# Patient Record
Sex: FEMALE | Race: BLACK OR AFRICAN AMERICAN | NOT HISPANIC OR LATINO | Employment: FULL TIME | URBAN - METROPOLITAN AREA
[De-identification: names, ages, dates, MRNs, and addresses within clinical notes are randomized per-mention and may not be internally consistent; named-entity substitution may affect disease eponyms.]

---

## 2017-04-28 ENCOUNTER — HOSPITAL ENCOUNTER (EMERGENCY)
Facility: HOSPITAL | Age: 32
Discharge: HOME | End: 2017-04-28
Attending: EMERGENCY MEDICINE
Payer: MEDICAID

## 2017-04-28 VITALS
BODY MASS INDEX: 27.97 KG/M2 | SYSTOLIC BLOOD PRESSURE: 126 MMHG | TEMPERATURE: 99 F | RESPIRATION RATE: 16 BRPM | OXYGEN SATURATION: 99 % | WEIGHT: 174 LBS | HEART RATE: 77 BPM | DIASTOLIC BLOOD PRESSURE: 82 MMHG | HEIGHT: 66 IN

## 2017-04-28 DIAGNOSIS — R07.9 CHEST PAIN, UNSPECIFIED TYPE: Primary | ICD-10-CM

## 2017-04-28 DIAGNOSIS — R07.9 CHEST PAIN: ICD-10-CM

## 2017-04-28 LAB
ANION GAP SERPL CALC-SCNC: 8 MMOL/L
ANISOCYTOSIS BLD QL SMEAR: SLIGHT
BASOPHILS # BLD AUTO: 0.04 K/UL
BASOPHILS NFR BLD: 0.7 %
BUN SERPL-MCNC: 9 MG/DL
BURR CELLS BLD QL SMEAR: ABNORMAL
CALCIUM SERPL-MCNC: 8.9 MG/DL
CHLORIDE SERPL-SCNC: 106 MMOL/L
CO2 SERPL-SCNC: 24 MMOL/L
CREAT SERPL-MCNC: 0.8 MG/DL
DIFFERENTIAL METHOD: ABNORMAL
EOSINOPHIL # BLD AUTO: 0.1 K/UL
EOSINOPHIL NFR BLD: 2.1 %
ERYTHROCYTE [DISTWIDTH] IN BLOOD BY AUTOMATED COUNT: 20.7 %
EST. GFR  (AFRICAN AMERICAN): >60 ML/MIN/1.73 M^2
EST. GFR  (NON AFRICAN AMERICAN): >60 ML/MIN/1.73 M^2
GIANT PLATELETS BLD QL SMEAR: PRESENT
GLUCOSE SERPL-MCNC: 107 MG/DL
HCT VFR BLD AUTO: 27.4 %
HGB BLD-MCNC: 8.4 G/DL
HYPOCHROMIA BLD QL SMEAR: ABNORMAL
INR PPP: 1
LYMPHOCYTES # BLD AUTO: 1.3 K/UL
LYMPHOCYTES NFR BLD: 23.5 %
MCH RBC QN AUTO: 20 PG
MCHC RBC AUTO-ENTMCNC: 30.7 %
MCV RBC AUTO: 65 FL
MONOCYTES # BLD AUTO: 0.6 K/UL
MONOCYTES NFR BLD: 9.9 %
NEUTROPHILS # BLD AUTO: 3.6 K/UL
NEUTROPHILS NFR BLD: 64 %
OVALOCYTES BLD QL SMEAR: ABNORMAL
PLATELET # BLD AUTO: 411 K/UL
PLATELET BLD QL SMEAR: ABNORMAL
PMV BLD AUTO: 8.2 FL
POIKILOCYTOSIS BLD QL SMEAR: SLIGHT
POLYCHROMASIA BLD QL SMEAR: ABNORMAL
POTASSIUM SERPL-SCNC: 3.7 MMOL/L
PROTHROMBIN TIME: 10.6 SEC
RBC # BLD AUTO: 4.2 M/UL
SODIUM SERPL-SCNC: 138 MMOL/L
TARGETS BLD QL SMEAR: ABNORMAL
TROPONIN I SERPL DL<=0.01 NG/ML-MCNC: <0.006 NG/ML
WBC # BLD AUTO: 5.67 K/UL

## 2017-04-28 PROCEDURE — 99284 EMERGENCY DEPT VISIT MOD MDM: CPT | Mod: 25

## 2017-04-28 PROCEDURE — 85025 COMPLETE CBC W/AUTO DIFF WBC: CPT

## 2017-04-28 PROCEDURE — 80048 BASIC METABOLIC PNL TOTAL CA: CPT

## 2017-04-28 PROCEDURE — 85610 PROTHROMBIN TIME: CPT

## 2017-04-28 PROCEDURE — 93005 ELECTROCARDIOGRAM TRACING: CPT

## 2017-04-28 PROCEDURE — 93010 ELECTROCARDIOGRAM REPORT: CPT | Mod: ,,, | Performed by: INTERNAL MEDICINE

## 2017-04-28 PROCEDURE — 84484 ASSAY OF TROPONIN QUANT: CPT

## 2017-04-28 NOTE — ED AVS SNAPSHOT
[facility]  [id number] [facility] [address]               Melissa Santos   2017  5:56 PM   ED    Description:  Female : 1985   Department:  [facility]            Your Care was Coordinated By:     Provider Role From To    Rick Aaron MD Attending Provider [phone] --      Reason for Visit     Chest Pain           Diagnoses this Visit        Comments    Chest pain, unspecified type    -  Primary     Chest pain           ED Disposition     None           To Do List           Follow-up Information     Follow up with Primary Doctor No In 1 day.    Why:  If symptoms worsen.      [facility]     [facility] -  Assistance  Unless otherwise directed by your provider, please contact [facility], our nurse care line that is available for  assistance.     Registered nurses in the [facility] provide: appointment scheduling, clinical advisement, health education, and other advisory services.  Call: [phone] (toll free)               Medications           Message regarding Medications     Verify the changes and/or additions to your medication regime listed below are the same as discussed with your clinician today.  If any of these changes or additions are incorrect, please notify your healthcare provider.             Verify that the below list of medications is an accurate representation of the medications you are currently taking.  If none reported, the list may be blank. If incorrect, please contact your healthcare provider. Carry this list with you in case of emergency.           Current Medications     divalproex (DEPAKOTE) 500 MG TbEC Take 500 mg by mouth every 12 (twelve) hours.    METRONIDAZOLE (FLAGYL ORAL) Take by mouth.    risperidone (RISPERDAL M-TAB) 0.5 MG TbDL Take by mouth.    diclofenac (VOLTAREN) 50 MG EC tablet Take 1 tablet (50 mg total) by mouth 3 (three)  "times daily as needed.    ferrous gluconate (FERGON) 325 MG Tab Take 1 tablet (325 mg total) by mouth daily with breakfast.    hydrOXYzine (ATARAX) 25 MG tablet Take 2-4 tablets ( mg total) by mouth every 6 to 8 hours as needed for Anxiety.    lorazepam (ATIVAN) 1 MG tablet Take 0.5 tablets (0.5 mg total) by mouth every 6 (six) hours as needed for Anxiety.    naproxen (NAPROSYN) 375 MG tablet Take 1 tablet (375 mg total) by mouth 2 (two) times daily with meals.           Clinical Reference Information           Your Vitals Were     BP Pulse Temp Resp Height Weight    124/83 71 98.4 °F (36.9 °C) (Oral) 14 5' 6" (1.676 m) 78.9 kg (174 lb)    SpO2 BMI             99% 28.08 kg/m2         Allergies as of 4/28/2017     No Known Allergies      Immunizations Administered on Date of Encounter - 4/28/2017     None      ED Micro, Lab, POCT     Start Ordered       Status Ordering Provider    04/28/17 1812 04/28/17 1811  CBC auto differential  STAT      Preliminary result     04/28/17 1812 04/28/17 1811  Basic metabolic panel  STAT      Final result     04/28/17 1812 04/28/17 1811  Protime-INR  STAT      Final result     04/28/17 1812 04/28/17 1811  Troponin I  STAT      Final result       ED Imaging Orders     Start Ordered       Status Ordering Provider    04/28/17 1812 04/28/17 1811  X-Ray Chest 1 View  1 time imaging      Final result         Discharge Instructions         Noncardiac Chest Pain    Based on your visit today, the health care provider doesnt know what is causing your chest pain. In most cases, people who come to the emergency department with chest pain dont have a problem with their heart. Instead, the pain is caused by other conditions. These may be problems with the lungs, muscles, bones, digestive tract, nerves, or mental health.  Lung problems  · Inflammation around the lungs (pleurisy)  · Collapsed lung (pneumothorax)  · Fluid around the lungs (pleural effusion)  · Lung cancer. This is a rare cause " of chest pain.  Muscle or bone problems  · Inflamed cartilage between the ribs (pleurisy)  · Fibromyalgia  · Rheumatoid arthritis  Digestive system problems  · Reflux  · Stomach ulcer  · Spasms of the esophagus  · Gall stones  · Gallbladder inflammation  Mental health conditions  · Panic or anxiety attacks  · Emotional distress  Your condition doesnt seem serious and your pain doesnt appear to be coming from your heart. But sometimes the signs of a serious problem take more time to appear. Watch for the warning signs listed below.  Home care  Follow these guidelines when caring for yourself at home:  · Rest today and avoid strenuous activity.  · Take any prescribed medicine as directed.  Follow-up care  Follow up with your health care provider, or as advised, if you dont start to feel better within 24 hours.  When to seek medical advice  Call your health care provider right away if any of these occur:  · A change in the type of pain. Call if it feels different, becomes more serious, lasts longer, or begins to spread into your shoulder, arm, neck, jaw, or back.  · Shortness of breath  · You feel more pain when you breathe  · Cough with dark-colored mucus or blood  · Weakness, dizziness, or fainting  · Fever of 100.4ºF (38ºC) or higher, or as directed by your health care provider  · Swelling, pain, or redness in one leg  Date Last Reviewed: 11/24/2014  © 1628-3513 [email or web address]. [address] [place] [address]. All rights reserved. This information is not intended as a substitute for professional medical care. Always follow your healthcare professional's instructions.           [facility] complies with applicable Federal civil rights laws and does not discriminate on the basis of race, color, national origin, age, disability, or sex.        Language Assistance Services     ATTENTION: Language assistance services are available, free of charge. Please call [phone].       ATENCIÓN: Si habla español, tiene a quiñones disposición servicios gratuitos de asistencia lingüística. Llame al [phone].     CHÚ Ý: N?u b?n nói Ti?ng Vi?t, có các d?ch v? h? tr? ngôn ng? mi?n phí dành cho b?n. G?i s? [phone].

## 2017-04-28 NOTE — ED PROVIDER NOTES
"SCRIBE #1 NOTE: I, Hai Radford, am scribing for, and in the presence of, Rick Aaron MD. I have scribed the entire note.      History      Chief Complaint   Patient presents with    Chest Pain     "i have been having these off and on chest pains       Review of patient's allergies indicates:  No Known Allergies     HPI   HPI    4/28/2017, 6:10 PM   History obtained from the patient      History of Present Illness: Melissa Santos is a 31 y.o. female patient who presents to the Emergency Department for mid-sternal chest pain which onset 5 days ago. Symptoms are intermitent and moderate in severity. Pt's chest pain doesn't radiate. Pt describes the chest pain as "a heaviness on her chest".  Pt's last episode of chest pain started yesterday around 3 PM and was constant until 1 PM today. Pt has no Hx of PE, birth control, recent long travel, or calf pain. No mitigating or exacerbating factors reported. No associated sx reported. Patient denies any SOB, n/v/d, diaphoresis, leg swelling, palpitations, HA, lightheadedness, dizziness, numbness, weakness, cough, and all other sxs at this time. No further complaints or concerns at this time.         Arrival mode: Personal vehicle     PCP: Primary Doctor No       Past Medical History:  Past Medical History:   Diagnosis Date    Anxiety     Bipolar disorder     Heart murmur        Past Surgical History:  Past Surgical History:   Procedure Laterality Date    TUBAL LIGATION      uterine ablation           Family History:  History reviewed. No pertinent family history.    Social History:  Social History     Social History Main Topics    Smoking status: Never Smoker    Smokeless tobacco: Unknown    Alcohol use Yes      Comment: sometimes    Drug use: No    Sexual activity: Unknown       ROS   Review of Systems   Constitutional: Negative for chills and fever.   HENT: Negative for sore throat.    Respiratory: Negative for cough and shortness of breath.  " "  Cardiovascular: Positive for chest pain. Negative for palpitations and leg swelling.   Gastrointestinal: Negative for abdominal pain, diarrhea, nausea and vomiting.   Genitourinary: Negative for dysuria.   Musculoskeletal: Negative for back pain.   Skin: Negative for rash.   Neurological: Negative for dizziness, weakness, light-headedness, numbness and headaches.   Hematological: Does not bruise/bleed easily.       Physical Exam    Initial Vitals   BP Pulse Resp Temp SpO2   04/28/17 1738 04/28/17 1738 04/28/17 1738 04/28/17 1738 04/28/17 1738   147/92 78 20 98.4 °F (36.9 °C) 99 %      Physical Exam  Nursing Notes and Vital Signs Reviewed.  Constitutional: Patient is in no acute distress. Awake and alert. Well-developed and well-nourished. PERC score of 0.  Head: Atraumatic. Normocephalic.  Eyes: PERRL. EOM intact. Conjunctivae are not pale. No scleral icterus.  ENT: Mucous membranes are moist. Oropharynx is clear and symmetric.    Neck: Supple. Full ROM. No lymphadenopathy.  Cardiovascular: Regular rate. Regular rhythm. No murmurs, rubs, or gallops. Distal pulses are 2+ and symmetric.  Pulmonary/Chest: No respiratory distress. Clear to auscultation bilaterally. No wheezing, rales, or rhonchi.  Abdominal: Soft and non-distended.  There is no tenderness.  No rebound, guarding, or rigidity.   Musculoskeletal: Moves all extremities. No obvious deformities. No edema. No calf tenderness. Calves are equal in size.  Skin: Warm and dry.  Neurological:  Alert, awake, and appropriate.  Normal speech.  No acute focal neurological deficits are appreciated.  Psychiatric: Normal affect. Good eye contact. Appropriate in content.    ED Course    Procedures  ED Vital Signs:  Vitals:    04/28/17 1738 04/28/17 1813   BP: (!) 147/92 124/83   Pulse: 78 71   Resp: 20 14   Temp: 98.4 °F (36.9 °C)    TempSrc: Oral    SpO2: 99%    Weight: 78.9 kg (174 lb)    Height: 5' 6" (1.676 m)        Abnormal Lab Results:  Labs Reviewed   CBC W/ AUTO " DIFFERENTIAL - Abnormal; Notable for the following:        Result Value    Hemoglobin 8.4 (*)     Hematocrit 27.4 (*)     MCV 65 (*)     MCH 20.0 (*)     MCHC 30.7 (*)     RDW 20.7 (*)     Platelets 411 (*)     MPV 8.2 (*)     All other components within normal limits   BASIC METABOLIC PANEL   PROTIME-INR   TROPONIN I        All Lab Results:  Results for orders placed or performed during the hospital encounter of 04/28/17   CBC auto differential   Result Value Ref Range    WBC 5.67 3.90 - 12.70 K/uL    RBC 4.20 4.00 - 5.40 M/uL    Hemoglobin 8.4 (L) 12.0 - 16.0 g/dL    Hematocrit 27.4 (L) 37.0 - 48.5 %    MCV 65 (L) 82 - 98 fL    MCH 20.0 (L) 27.0 - 31.0 pg    MCHC 30.7 (L) 32.0 - 36.0 %    RDW 20.7 (H) 11.5 - 14.5 %    Platelets 411 (H) 150 - 350 K/uL    MPV 8.2 (L) 9.2 - 12.9 fL   Basic metabolic panel   Result Value Ref Range    Sodium 138 136 - 145 mmol/L    Potassium 3.7 3.5 - 5.1 mmol/L    Chloride 106 95 - 110 mmol/L    CO2 24 23 - 29 mmol/L    Glucose 107 70 - 110 mg/dL    BUN, Bld 9 6 - 20 mg/dL    Creatinine 0.8 0.5 - 1.4 mg/dL    Calcium 8.9 8.7 - 10.5 mg/dL    Anion Gap 8 8 - 16 mmol/L    eGFR if African American >60 >60 mL/min/1.73 m^2    eGFR if non African American >60 >60 mL/min/1.73 m^2   Protime-INR   Result Value Ref Range    Prothrombin Time 10.6 9.0 - 12.5 sec    INR 1.0 0.8 - 1.2   Troponin I   Result Value Ref Range    Troponin I <0.006 0.000 - 0.026 ng/mL         Imaging Results:  Imaging Results         X-Ray Chest 1 View (Final result) Result time:  04/28/17 18:52:34    Final result by Raul Gaffney MD (04/28/17 18:52:34)    Impression:     Normal chest      Electronically signed by: RAUL GAFFNEY MD  Date:     04/28/17  Time:    18:52     Narrative:    Portable chest    Clinical indication: Chest pain    Findings: The heart and lungs are normal.  There are no infiltrates.  No change from the prior exam of 08/29/2016.                      The Emergency Provider reviewed the vital  signs and test results, which are outlined above.    ED Discussion     7:35 PM: Reassessed pt at this time. Pt is awake, alert, and in no distress. Discussed with pt all pertinent ED information and results. Discussed pt dx and plan of tx. Gave pt all f/u and return to the ED instructions. All questions and concerns were addressed at this time. Pt expresses understanding of information and instructions, and is comfortable with plan to discharge. Pt is stable for discharge.    I have discussed with patient and/or family/caretaker chest pain precautions, specifically to return for worsening chest pain, shortness of breath, fever, or any concern.  I have low suspicion for cardiopulmonary, vascular, infectious, respiratory, or other emergent medical condition based on my evaluation in the ED.      ED Medication(s):  Medications - No data to display    New Prescriptions    No medications on file       Follow-up Information     Follow up with Primary Doctor No In 1 day.    Why:  If symptoms worsen.            Medical Decision Making    Medical Decision Making:   Clinical Tests:   Lab Tests: Reviewed and Ordered  Radiological Study: Reviewed and Ordered  Medical Tests: Reviewed and Ordered           Scribe Attestation:   Scribe #1: I performed the above scribed service and the documentation accurately describes the services I performed. I attest to the accuracy of the note.    Attending:   Physician Attestation Statement for Scribe #1: I, Rick Aaron MD, personally performed the services described in this documentation, as scribed by Hai Radford, in my presence, and it is both accurate and complete.          Clinical Impression       ICD-10-CM ICD-9-CM   1. Chest pain, unspecified type R07.9 786.50   2. Chest pain R07.9 786.50       Disposition:   Disposition: Discharged  Condition: Stable         Rick Aaron MD  04/29/17 0014

## 2017-04-29 NOTE — DISCHARGE INSTRUCTIONS
Noncardiac Chest Pain    Based on your visit today, the health care provider doesnt know what is causing your chest pain. In most cases, people who come to the emergency department with chest pain dont have a problem with their heart. Instead, the pain is caused by other conditions. These may be problems with the lungs, muscles, bones, digestive tract, nerves, or mental health.  Lung problems  · Inflammation around the lungs (pleurisy)  · Collapsed lung (pneumothorax)  · Fluid around the lungs (pleural effusion)  · Lung cancer. This is a rare cause of chest pain.  Muscle or bone problems  · Inflamed cartilage between the ribs (pleurisy)  · Fibromyalgia  · Rheumatoid arthritis  Digestive system problems  · Reflux  · Stomach ulcer  · Spasms of the esophagus  · Gall stones  · Gallbladder inflammation  Mental health conditions  · Panic or anxiety attacks  · Emotional distress  Your condition doesnt seem serious and your pain doesnt appear to be coming from your heart. But sometimes the signs of a serious problem take more time to appear. Watch for the warning signs listed below.  Home care  Follow these guidelines when caring for yourself at home:  · Rest today and avoid strenuous activity.  · Take any prescribed medicine as directed.  Follow-up care  Follow up with your health care provider, or as advised, if you dont start to feel better within 24 hours.  When to seek medical advice  Call your health care provider right away if any of these occur:  · A change in the type of pain. Call if it feels different, becomes more serious, lasts longer, or begins to spread into your shoulder, arm, neck, jaw, or back.  · Shortness of breath  · You feel more pain when you breathe  · Cough with dark-colored mucus or blood  · Weakness, dizziness, or fainting  · Fever of 100.4ºF (38ºC) or higher, or as directed by your health care provider  · Swelling, pain, or redness in one leg  Date Last Reviewed: 11/24/2014  © 0277-1363  [email or web address]. [address] [place] [address], [place] [address]. All rights reserved. This information is not intended as a substitute for professional medical care. Always follow your healthcare professional's instructions.

## 2018-09-19 ENCOUNTER — HOSPITAL ENCOUNTER (EMERGENCY)
Facility: HOSPITAL | Age: 33
Discharge: HOME | End: 2018-09-19
Attending: EMERGENCY MEDICINE

## 2018-09-19 VITALS
BODY MASS INDEX: 26.62 KG/M2 | WEIGHT: 169.63 LBS | SYSTOLIC BLOOD PRESSURE: 133 MMHG | OXYGEN SATURATION: 99 % | HEART RATE: 89 BPM | HEIGHT: 67 IN | RESPIRATION RATE: 18 BRPM | TEMPERATURE: 98 F | DIASTOLIC BLOOD PRESSURE: 81 MMHG

## 2018-09-19 DIAGNOSIS — R07.89 CHEST TIGHTNESS: ICD-10-CM

## 2018-09-19 DIAGNOSIS — F41.9 ANXIETY: Primary | ICD-10-CM

## 2018-09-19 DIAGNOSIS — Z86.59 HISTORY OF ANXIETY: ICD-10-CM

## 2018-09-19 PROCEDURE — 93005 ELECTROCARDIOGRAM TRACING: CPT

## 2018-09-19 PROCEDURE — 93010 ELECTROCARDIOGRAM REPORT: CPT | Mod: ,,, | Performed by: INTERNAL MEDICINE

## 2018-09-19 PROCEDURE — 99283 EMERGENCY DEPT VISIT LOW MDM: CPT | Mod: 25

## 2018-09-19 PROCEDURE — 25000003 PHARM REV CODE 250: Performed by: REGISTERED NURSE

## 2018-09-19 RX ORDER — ALPRAZOLAM 0.5 MG/1
0.5 TABLET ORAL
Status: COMPLETED | OUTPATIENT
Start: 2018-09-19 | End: 2018-09-19

## 2018-09-19 RX ORDER — ALPRAZOLAM 0.5 MG/1
0.5 TABLET ORAL 3 TIMES DAILY PRN
Qty: 12 TABLET | Refills: 0 | Status: SHIPPED | OUTPATIENT
Start: 2018-09-19 | End: 2018-10-19

## 2018-09-19 RX ADMIN — ALPRAZOLAM 0.5 MG: 0.5 TABLET ORAL at 02:09
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
## 2019-05-25 ENCOUNTER — HOSPITAL ENCOUNTER (EMERGENCY)
Facility: HOSPITAL | Age: 34
Discharge: HOME | End: 2019-05-25
Attending: EMERGENCY MEDICINE
Payer: COMMERCIAL

## 2019-05-25 VITALS
SYSTOLIC BLOOD PRESSURE: 147 MMHG | RESPIRATION RATE: 18 BRPM | TEMPERATURE: 99 F | OXYGEN SATURATION: 99 % | HEART RATE: 71 BPM | DIASTOLIC BLOOD PRESSURE: 96 MMHG | BODY MASS INDEX: 28.19 KG/M2 | WEIGHT: 180 LBS

## 2019-05-25 DIAGNOSIS — F41.0 PANIC ATTACK: ICD-10-CM

## 2019-05-25 DIAGNOSIS — Z04.1 ENCOUNTER FOR EXAMINATION FOLLOWING MOTOR VEHICLE ACCIDENT (MVA): Primary | ICD-10-CM

## 2019-05-25 DIAGNOSIS — S33.5XXA SPRAIN OF LOW BACK, INITIAL ENCOUNTER: ICD-10-CM

## 2019-05-25 PROCEDURE — 25000003 PHARM REV CODE 250: Performed by: NURSE PRACTITIONER

## 2019-05-25 PROCEDURE — 86703 HIV-1/HIV-2 1 RESULT ANTBDY: CPT

## 2019-05-25 PROCEDURE — 99283 EMERGENCY DEPT VISIT LOW MDM: CPT

## 2019-05-25 RX ORDER — METHOCARBAMOL 500 MG/1
1000 TABLET, FILM COATED ORAL 3 TIMES DAILY PRN
Qty: 30 TABLET | Refills: 0 | Status: SHIPPED | OUTPATIENT
Start: 2019-05-25 | End: 2019-05-30

## 2019-05-25 RX ORDER — METHOCARBAMOL 500 MG/1
500 TABLET, FILM COATED ORAL
Status: COMPLETED | OUTPATIENT
Start: 2019-05-25 | End: 2019-05-25

## 2019-05-25 RX ORDER — KETOROLAC TROMETHAMINE 10 MG/1
10 TABLET, FILM COATED ORAL
Status: COMPLETED | OUTPATIENT
Start: 2019-05-25 | End: 2019-05-25

## 2019-05-25 RX ORDER — LORAZEPAM 1 MG/1
1 TABLET ORAL
Status: COMPLETED | OUTPATIENT
Start: 2019-05-25 | End: 2019-05-25

## 2019-05-25 RX ORDER — DICLOFENAC SODIUM 50 MG/1
50 TABLET, DELAYED RELEASE ORAL 3 TIMES DAILY PRN
Qty: 15 TABLET | Refills: 0 | Status: SHIPPED | OUTPATIENT
Start: 2019-05-25

## 2019-05-25 RX ADMIN — METHOCARBAMOL TABLETS 500 MG: 500 TABLET, COATED ORAL at 07:05

## 2019-05-25 RX ADMIN — LORAZEPAM 1 MG: 1 TABLET ORAL at 07:05

## 2019-05-25 RX ADMIN — KETOROLAC TROMETHAMINE 10 MG: 10 TABLET, FILM COATED ORAL at 07:05
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
## 2019-05-26 NOTE — ED NOTES
Patient identifiers verified and correct for Melissa Santos.      LOC: The patient is awake, alert and aware of environment with an appropriate affect, the patient is oriented x 3 and speaking appropriately. Pt denies hitting head and pt denies LOC.   APPEARANCE: Patient resting comfortably and in no acute distress, patient is clean and well groomed, patient's clothing is properly fastened.  SKIN: The skin is warm and dry, color consistent with ethnicity, patient has normal skin turgor and moist mucus membranes, skin intact, no breakdown or bruising noted.  MUSCULOSKELETAL: Patient moving all extremities spontaneously. Upper back pain.   RESPIRATORY: Airway is open and patent, respirations are spontaneous.  CARDIAC: Patient has a normal rate, no periphreal edema noted, capillary refill < 3 seconds.  ABDOMEN: Soft and non tender to palpation.    
1) PCP Contacted on Admission: (Y/N) --> Name & Phone #: Patient primarily resides in [place] and does not have a PCP in the [place]. Due to return to [place] on November 5th.   2) Date of Contact with PCP: N/A  3) PCP Contacted at Discharge: (N/A)  4) Summary of Handoff Given to PCP: N/A   5) Post-Discharge Appointment Date and Location: N/A
1) PCP Contacted on Admission: (Y/N) --> Name & Phone #:  2) Date of Contact with PCP:  3) PCP Contacted at Discharge: (Y/N)  4) Summary of Handoff Given to PCP:   5) Post-Discharge Appointment Date and Location:

## 2019-05-27 LAB — HIV 1+2 AB+HIV1 P24 AG SERPL QL IA: NEGATIVE

## 2020-03-17 ENCOUNTER — HOSPITAL ENCOUNTER (EMERGENCY)
Facility: HOSPITAL | Age: 35
Discharge: HOME | End: 2020-03-17
Attending: EMERGENCY MEDICINE
Payer: COMMERCIAL

## 2020-03-17 VITALS
DIASTOLIC BLOOD PRESSURE: 97 MMHG | BODY MASS INDEX: 29.53 KG/M2 | SYSTOLIC BLOOD PRESSURE: 160 MMHG | OXYGEN SATURATION: 97 % | WEIGHT: 183.75 LBS | HEIGHT: 66 IN | HEART RATE: 66 BPM | RESPIRATION RATE: 18 BRPM | TEMPERATURE: 98 F

## 2020-03-17 DIAGNOSIS — R10.9 FLANK PAIN: Primary | ICD-10-CM

## 2020-03-17 LAB
ALBUMIN SERPL BCP-MCNC: 3.8 G/DL (ref 3.5–5.2)
ALP SERPL-CCNC: 73 U/L (ref 55–135)
ALT SERPL W/O P-5'-P-CCNC: 23 U/L (ref 10–44)
ANION GAP SERPL CALC-SCNC: 10 MMOL/L (ref 8–16)
AST SERPL-CCNC: 23 U/L (ref 10–40)
BACTERIA #/AREA URNS HPF: NORMAL /HPF
BASOPHILS # BLD AUTO: 0.01 K/UL (ref 0–0.2)
BASOPHILS NFR BLD: 0.2 % (ref 0–1.9)
BILIRUB SERPL-MCNC: 0.5 MG/DL (ref 0.1–1)
BILIRUB UR QL STRIP: NEGATIVE
BUN SERPL-MCNC: 8 MG/DL (ref 6–20)
CALCIUM SERPL-MCNC: 9.3 MG/DL (ref 8.7–10.5)
CHLORIDE SERPL-SCNC: 106 MMOL/L (ref 95–110)
CLARITY UR: CLEAR
CO2 SERPL-SCNC: 23 MMOL/L (ref 23–29)
COLOR UR: YELLOW
CREAT SERPL-MCNC: 0.8 MG/DL (ref 0.5–1.4)
DIFFERENTIAL METHOD: ABNORMAL
EOSINOPHIL # BLD AUTO: 0.1 K/UL (ref 0–0.5)
EOSINOPHIL NFR BLD: 2.4 % (ref 0–8)
ERYTHROCYTE [DISTWIDTH] IN BLOOD BY AUTOMATED COUNT: 15.3 % (ref 11.5–14.5)
EST. GFR  (AFRICAN AMERICAN): >60 ML/MIN/1.73 M^2
EST. GFR  (NON AFRICAN AMERICAN): >60 ML/MIN/1.73 M^2
GLUCOSE SERPL-MCNC: 104 MG/DL (ref 70–110)
GLUCOSE UR QL STRIP: NEGATIVE
HCT VFR BLD AUTO: 36 % (ref 37–48.5)
HGB BLD-MCNC: 11.3 G/DL (ref 12–16)
HGB UR QL STRIP: ABNORMAL
IMM GRANULOCYTES # BLD AUTO: 0.01 K/UL (ref 0–0.04)
IMM GRANULOCYTES NFR BLD AUTO: 0.2 % (ref 0–0.5)
KETONES UR QL STRIP: NEGATIVE
LEUKOCYTE ESTERASE UR QL STRIP: ABNORMAL
LYMPHOCYTES # BLD AUTO: 1 K/UL (ref 1–4.8)
LYMPHOCYTES NFR BLD: 24.2 % (ref 18–48)
MCH RBC QN AUTO: 25.7 PG (ref 27–31)
MCHC RBC AUTO-ENTMCNC: 31.4 G/DL (ref 32–36)
MCV RBC AUTO: 82 FL (ref 82–98)
MICROSCOPIC COMMENT: NORMAL
MONOCYTES # BLD AUTO: 0.5 K/UL (ref 0.3–1)
MONOCYTES NFR BLD: 11 % (ref 4–15)
NEUTROPHILS # BLD AUTO: 2.5 K/UL (ref 1.8–7.7)
NEUTROPHILS NFR BLD: 62 % (ref 38–73)
NITRITE UR QL STRIP: NEGATIVE
NRBC BLD-RTO: 0 /100 WBC
PH UR STRIP: 6 [PH] (ref 5–8)
PLATELET # BLD AUTO: 264 K/UL (ref 150–350)
PMV BLD AUTO: 8.9 FL (ref 9.2–12.9)
POTASSIUM SERPL-SCNC: 3.8 MMOL/L (ref 3.5–5.1)
PROT SERPL-MCNC: 7.6 G/DL (ref 6–8.4)
PROT UR QL STRIP: NEGATIVE
RBC # BLD AUTO: 4.39 M/UL (ref 4–5.4)
RBC #/AREA URNS HPF: 1 /HPF (ref 0–4)
SODIUM SERPL-SCNC: 139 MMOL/L (ref 136–145)
SP GR UR STRIP: >=1.03 (ref 1–1.03)
URN SPEC COLLECT METH UR: ABNORMAL
UROBILINOGEN UR STRIP-ACNC: 1 EU/DL
WBC # BLD AUTO: 4.09 K/UL (ref 3.9–12.7)
WBC #/AREA URNS HPF: 3 /HPF (ref 0–5)

## 2020-03-17 PROCEDURE — 99284 PR EMERGENCY DEPT VISIT,LEVEL IV: ICD-10-PCS | Mod: ,,, | Performed by: COLON & RECTAL SURGERY

## 2020-03-17 PROCEDURE — 80053 COMPREHEN METABOLIC PANEL: CPT

## 2020-03-17 PROCEDURE — 99284 EMERGENCY DEPT VISIT MOD MDM: CPT | Mod: 25

## 2020-03-17 PROCEDURE — 85025 COMPLETE CBC W/AUTO DIFF WBC: CPT

## 2020-03-17 PROCEDURE — 99284 EMERGENCY DEPT VISIT MOD MDM: CPT | Mod: ,,, | Performed by: COLON & RECTAL SURGERY

## 2020-03-17 PROCEDURE — 81000 URINALYSIS NONAUTO W/SCOPE: CPT

## 2020-03-17 RX ORDER — NAPROXEN 375 MG/1
375 TABLET ORAL 2 TIMES DAILY WITH MEALS
Qty: 30 TABLET | Refills: 0 | Status: SHIPPED | OUTPATIENT
Start: 2020-03-17

## 2020-03-17 NOTE — HPI
34-year-old female who presents to the [facility] emergency department for a 1 day history of right flank pain.  Patient states that the pain was mildly severe in the right flank that a constant moderate severity.  She denies any associated nausea, vomiting, fever, chills, diarrhea or constipation.  He has no significant medical or surgical history.  She had a normal WBC count in the ER.  She had a CT scan with a mildly dilated appendix but air-filled lumen which could not completely rule out appendicitis although it was doubted.  Surgery is consulted for evaluation.

## 2020-03-17 NOTE — SUBJECTIVE & OBJECTIVE
No current facility-administered medications on file prior to encounter.      Current Outpatient Medications on File Prior to Encounter   Medication Sig    ALPRAZolam (XANAX) 0.5 MG tablet Take 1 tablet (0.5 mg total) by mouth 3 (three) times daily as needed for Anxiety. (Patient not taking: Reported on 3/17/2020)    diclofenac (VOLTAREN) 50 MG EC tablet Take 1 tablet (50 mg total) by mouth 3 (three) times daily as needed. (Patient not taking: Reported on 3/17/2020)    diclofenac (VOLTAREN) 50 MG EC tablet Take 1 tablet (50 mg total) by mouth 3 (three) times daily as needed. (Patient not taking: Reported on 3/17/2020)    divalproex (DEPAKOTE) 500 MG TbEC Take 500 mg by mouth every 12 (twelve) hours.    ferrous gluconate (FERGON) 325 MG Tab Take 1 tablet (325 mg total) by mouth daily with breakfast. (Patient not taking: Reported on 3/17/2020)    hydrOXYzine (ATARAX) 25 MG tablet Take 2-4 tablets ( mg total) by mouth every 6 to 8 hours as needed for Anxiety. (Patient not taking: Reported on 3/17/2020)    lorazepam (ATIVAN) 1 MG tablet Take 0.5 tablets (0.5 mg total) by mouth every 6 (six) hours as needed for Anxiety. (Patient not taking: Reported on 3/17/2020)    METRONIDAZOLE (FLAGYL ORAL) Take by mouth.    risperidone (RISPERDAL M-TAB) 0.5 MG TbDL Take by mouth.    [DISCONTINUED] naproxen (NAPROSYN) 375 MG tablet Take 1 tablet (375 mg total) by mouth 2 (two) times daily with meals. (Patient not taking: Reported on 3/17/2020)       Review of patient's allergies indicates:  No Known Allergies    Past Medical History:   Diagnosis Date    Anxiety     Bipolar disorder     Heart murmur      Past Surgical History:   Procedure Laterality Date    TUBAL LIGATION      uterine ablation       Family History     None        Tobacco Use    Smoking status: Never Smoker   Substance and Sexual Activity    Alcohol use: Yes     Comment: sometimes    Drug use: No    Sexual activity: Not on file     Review of  Systems   Constitutional: Negative for activity change, appetite change, chills, fatigue, fever and unexpected weight change.   HENT: Negative for congestion, ear pain, sore throat and trouble swallowing.    Eyes: Negative for pain, redness and itching.   Respiratory: Negative for cough, shortness of breath and wheezing.    Cardiovascular: Negative for chest pain, palpitations and leg swelling.   Gastrointestinal: Negative for abdominal distention, abdominal pain, anal bleeding, blood in stool, constipation, diarrhea, nausea, rectal pain and vomiting.   Endocrine: Negative for cold intolerance, heat intolerance and polyuria.   Genitourinary: Positive for flank pain. Negative for dysuria, frequency and hematuria.   Musculoskeletal: Negative for gait problem, joint swelling and neck pain.   Skin: Negative for color change, rash and wound.   Allergic/Immunologic: Negative for environmental allergies and immunocompromised state.   Neurological: Negative for dizziness, speech difficulty, weakness and numbness.   Psychiatric/Behavioral: Negative for agitation, confusion and hallucinations.     Objective:     Vital Signs (Most Recent):  Temp: 98.4 °F (36.9 °C) (03/17/20 0736)  Pulse: 66 (03/17/20 0736)  Resp: 18 (03/17/20 0736)  BP: (!) 160/97 (03/17/20 0736)  SpO2: 99 % (03/17/20 0736) Vital Signs (24h Range):  Temp:  [98.4 °F (36.9 °C)-98.6 °F (37 °C)] 98.4 °F (36.9 °C)  Pulse:  [66-74] 66  Resp:  [18] 18  SpO2:  [99 %-100 %] 99 %  BP: (160-166)/(95-97) 160/97     Weight: 83.3 kg (183 lb 12.1 oz)  Body mass index is 29.66 kg/m².    Physical Exam   Constitutional: She is oriented to person, place, and time. She appears well-developed.   HENT:   Head: Normocephalic and atraumatic.   Eyes: Conjunctivae and EOM are normal.   Neck: Normal range of motion. No thyromegaly present.   Cardiovascular: Normal rate and regular rhythm.   Pulmonary/Chest: Effort normal. No respiratory distress.   Abdominal: Soft. She exhibits no  distension and no mass. There is no tenderness.   No RLQ TTP; +mild R flank tenderness; no previous incisions   Musculoskeletal: Normal range of motion. She exhibits no edema or tenderness.   Neurological: She is alert and oriented to person, place, and time.   Skin: Skin is warm and dry. Capillary refill takes less than 2 seconds. No rash noted.   Psychiatric: She has a normal mood and affect.       Significant Labs:  CBC:   Recent Labs   Lab 03/17/20  0637   WBC 4.09   RBC 4.39   HGB 11.3*   HCT 36.0*      MCV 82   MCH 25.7*   MCHC 31.4*     BMP:   Recent Labs   Lab 03/17/20  0637         K 3.8      CO2 23   BUN 8   CREATININE 0.8   CALCIUM 9.3       Significant Diagnostics:  I have reviewed all pertinent imaging results/findings within the past 24 hours.   FINDINGS:  The heart size is normal.  Lung bases are grossly clear.  There is no free intraperitoneal air or bowel obstruction.  Bony windows show no gross acute abnormality.    Liver and spleen show no focal abnormality for a non contrasted scan.  Gallbladder, adrenal glands, and pancreas are unremarkable.  There is no ascites or adenopathy.  Moderate volume of stool noted in the colon.  There is a small fat containing umbilical hernia.  The appendix is upper limits of normal in size at 7 mm.  There is intraluminal air however without definite wall thickening.  This is probably upper limits of normal, please correlate.  Retrocecal appendix.    Bladder is grossly unremarkable.  Suspect a few diverticula in the distal colon without may evidence of diverticulitis.    The kidneys show no suspicious mass.  No may obstruction.  No definite stones.    Lack of oral contrast limits evaluation of bowel loops but there is no definite acute abnormality suggested.  Calcifications in the pelvis presumably due to phleboliths.  Probable small ovarian cysts.      Impression       1.  No kidney stones or obstruction.    2.  The appendix measures 7  mm in diameter which is borderline.  There is intraluminal air however.  Doubt but can not totally exclude early appendicitis from the size.  Please correlate clinically.    3.  No bowel obstruction.  No free air.

## 2020-03-17 NOTE — ED PROVIDER NOTES
SCRIBE #1 NOTE: I, Emre Quinteros, am scribing for, and in the presence of, Cuauhtemoc Coburn MD. I have scribed the entire note.      History      Chief Complaint   Patient presents with    Flank Pain     pt c/o Rt flank pain since this morning, pt denies the pain is worse with movement, dysuria, or n/v       Review of patient's allergies indicates:  No Known Allergies     HPI   HPI    3/17/2020, 6:56 AM   History obtained from the patient      History of Present Illness: Melissa Santos is a 34 y.o. female patient who presents to the Emergency Department for R-sided flank pain, onset just PTA. Symptoms are constant and moderate in severity. No mitigating or exacerbating factors reported. No associated sxs reported. Patient denies any fever, chills, n/v/d, dysuria, SOB, CP, weakness, numbness, dizziness, headache, and all other sxs at this time. No prior Tx reported. No further complaints or concerns at this time.     Arrival mode: Personal vehicle    PCP: Primary Doctor No       Past Medical History:  Past Medical History:   Diagnosis Date    Anxiety     Bipolar disorder     Heart murmur        Past Surgical History:  Past Surgical History:   Procedure Laterality Date    TUBAL LIGATION      uterine ablation           Family History:  No family history on file.    Social History:  Social History     Tobacco Use    Smoking status: Never Smoker   Substance and Sexual Activity    Alcohol use: Yes     Comment: sometimes    Drug use: No    Sexual activity: Not on file       ROS   Review of Systems   Constitutional: Negative for chills, diaphoresis, fatigue and fever.   HENT: Negative for sore throat.    Respiratory: Negative for shortness of breath.    Cardiovascular: Negative for chest pain.   Gastrointestinal: Negative for diarrhea, nausea and vomiting.   Genitourinary: Positive for flank pain (R). Negative for dysuria.   Musculoskeletal: Negative for back pain.   Skin: Negative for rash.  "  Neurological: Negative for dizziness, weakness, light-headedness, numbness and headaches.   Hematological: Does not bruise/bleed easily.   All other systems reviewed and are negative.    Physical Exam      Initial Vitals [03/17/20 0622]   BP Pulse Resp Temp SpO2   (!) 166/95 74 18 98.6 °F (37 °C) 100 %      MAP       --          Physical Exam  Nursing Notes and Vital Signs Reviewed.  Constitutional: Patient is in no acute distress. Well-developed and well-nourished.  Head: Atraumatic. Normocephalic.  Eyes: PERRL. EOM intact. Conjunctivae are not pale. No scleral icterus.  ENT: Mucous membranes are moist. Oropharynx is clear and symmetric.    Neck: Supple. Full ROM. No lymphadenopathy.  Cardiovascular: Regular rate. Regular rhythm. No murmurs, rubs, or gallops. Distal pulses are 2+ and symmetric.  Pulmonary/Chest: No respiratory distress. Clear to auscultation bilaterally. No wheezing or rales.  Abdominal: Soft and non-distended. There is RLQ tenderness.  No rebound, guarding, or rigidity. Good bowel sounds.  Genitourinary: No CVA tenderness  Musculoskeletal: Moves all extremities. No obvious deformities. No edema.  Skin: Warm and dry.  Neurological:  Alert, awake, and appropriate.  Normal speech.  No acute focal neurological deficits are appreciated.  Psychiatric: Normal affect. Good eye contact. Appropriate in content.    ED Course    Procedures  ED Vital Signs:  Vitals:    03/17/20 0622 03/17/20 0736   BP: (!) 166/95 (!) 160/97   Pulse: 74 66   Resp: 18 18   Temp: 98.6 °F (37 °C) 98.4 °F (36.9 °C)   TempSrc: Oral Oral   SpO2: 100% 99%   Weight: 83.3 kg (183 lb 12.1 oz)    Height: 5' 6" (1.676 m)        Abnormal Lab Results:  Labs Reviewed   CBC W/ AUTO DIFFERENTIAL - Abnormal; Notable for the following components:       Result Value    Hemoglobin 11.3 (*)     Hematocrit 36.0 (*)     Mean Corpuscular Hemoglobin 25.7 (*)     Mean Corpuscular Hemoglobin Conc 31.4 (*)     RDW 15.3 (*)     MPV 8.9 (*)     All other " components within normal limits   URINALYSIS, REFLEX TO URINE CULTURE - Abnormal; Notable for the following components:    Specific Gravity, UA >=1.030 (*)     Occult Blood UA Trace (*)     Leukocytes, UA 1+ (*)     All other components within normal limits    Narrative:     Preferred Collection Type->Urine, Clean Catch   COMPREHENSIVE METABOLIC PANEL   URINALYSIS MICROSCOPIC    Narrative:     Preferred Collection Type->Urine, Clean Catch        All Lab Results:  Results for orders placed or performed during the hospital encounter of 03/17/20   CBC auto differential   Result Value Ref Range    WBC 4.09 3.90 - 12.70 K/uL    RBC 4.39 4.00 - 5.40 M/uL    Hemoglobin 11.3 (L) 12.0 - 16.0 g/dL    Hematocrit 36.0 (L) 37.0 - 48.5 %    Mean Corpuscular Volume 82 82 - 98 fL    Mean Corpuscular Hemoglobin 25.7 (L) 27.0 - 31.0 pg    Mean Corpuscular Hemoglobin Conc 31.4 (L) 32.0 - 36.0 g/dL    RDW 15.3 (H) 11.5 - 14.5 %    Platelets 264 150 - 350 K/uL    MPV 8.9 (L) 9.2 - 12.9 fL    Immature Granulocytes 0.2 0.0 - 0.5 %    Gran # (ANC) 2.5 1.8 - 7.7 K/uL    Immature Grans (Abs) 0.01 0.00 - 0.04 K/uL    Lymph # 1.0 1.0 - 4.8 K/uL    Mono # 0.5 0.3 - 1.0 K/uL    Eos # 0.1 0.0 - 0.5 K/uL    Baso # 0.01 0.00 - 0.20 K/uL    nRBC 0 0 /100 WBC    Gran% 62.0 38.0 - 73.0 %    Lymph% 24.2 18.0 - 48.0 %    Mono% 11.0 4.0 - 15.0 %    Eosinophil% 2.4 0.0 - 8.0 %    Basophil% 0.2 0.0 - 1.9 %    Differential Method Automated    Comprehensive metabolic panel   Result Value Ref Range    Sodium 139 136 - 145 mmol/L    Potassium 3.8 3.5 - 5.1 mmol/L    Chloride 106 95 - 110 mmol/L    CO2 23 23 - 29 mmol/L    Glucose 104 70 - 110 mg/dL    BUN, Bld 8 6 - 20 mg/dL    Creatinine 0.8 0.5 - 1.4 mg/dL    Calcium 9.3 8.7 - 10.5 mg/dL    Total Protein 7.6 6.0 - 8.4 g/dL    Albumin 3.8 3.5 - 5.2 g/dL    Total Bilirubin 0.5 0.1 - 1.0 mg/dL    Alkaline Phosphatase 73 55 - 135 U/L    AST 23 10 - 40 U/L    ALT 23 10 - 44 U/L    Anion Gap 10 8 - 16 mmol/L     eGFR if African American >60 >60 mL/min/1.73 m^2    eGFR if non African American >60 >60 mL/min/1.73 m^2   Urinalysis, Reflex to Urine Culture Urine, Clean Catch   Result Value Ref Range    Specimen UA Urine, Clean Catch     Color, UA Yellow Yellow, Straw, [place]    Appearance, UA Clear Clear    pH, UA 6.0 5.0 - 8.0    Specific Gravity, UA >=1.030 (A) 1.005 - 1.030    Protein, UA Negative Negative    Glucose, UA Negative Negative    Ketones, UA Negative Negative    Bilirubin (UA) Negative Negative    Occult Blood UA Trace (A) Negative    Nitrite, UA Negative Negative    Urobilinogen, UA 1.0 <2.0 EU/dL    Leukocytes, UA 1+ (A) Negative   Urinalysis Microscopic   Result Value Ref Range    RBC, UA 1 0 - 4 /hpf    WBC, UA 3 0 - 5 /hpf    Bacteria Occasional None-Occ /hpf    Microscopic Comment SEE COMMENT      Imaging Results:  Imaging Results          CT Renal Stone Study ABD Pelvis WO (Final result)  Result time 03/17/20 07:39:28    Final result by Hai Thapa III, MD (03/17/20 07:39:28)                 Impression:      1.  No kidney stones or obstruction.    2.  The appendix measures 7 mm in diameter which is borderline.  There is intraluminal air however.  Doubt but can not totally exclude early appendicitis from the size.  Please correlate clinically.    3.  No bowel obstruction.  No free air.    All CT scans at this facility are performed  using dose modulation techniques as appropriate to performed exam including the following:  automated exposure control; adjustment of mA and/or kV according to the patients size (this includes techniques or standardized protocols for targeted exams where dose is matched to indication/reason for exam: i.e. extremities or head);  iterative reconstruction technique.      Electronically signed by: Hai Thapa MD  Date:    03/17/2020  Time:    07:39             Narrative:    EXAMINATION:  CT RENAL STONE STUDY ABD PELVIS WO    CLINICAL HISTORY:  Flank pain, stone disease  suspected;    TECHNIQUE:  Axial CT images performed through the abdomen and pelvis without intravenous contrast. Multiplanar reformats were performed and interpreted.    COMPARISON:  None    FINDINGS:  The heart size is normal.  Lung bases are grossly clear.  There is no free intraperitoneal air or bowel obstruction.  Bony windows show no gross acute abnormality.    Liver and spleen show no focal abnormality for a non contrasted scan.  Gallbladder, adrenal glands, and pancreas are unremarkable.  There is no ascites or adenopathy.  Moderate volume of stool noted in the colon.  There is a small fat containing umbilical hernia.  The appendix is upper limits of normal in size at 7 mm.  There is intraluminal air however without definite wall thickening.  This is probably upper limits of normal, please correlate.  Retrocecal appendix.    Bladder is grossly unremarkable.  Suspect a few diverticula in the distal colon without may evidence of diverticulitis.    The kidneys show no suspicious mass.  No may obstruction.  No definite stones.    Lack of oral contrast limits evaluation of bowel loops but there is no definite acute abnormality suggested.  Calcifications in the pelvis presumably due to phleboliths.  Probable small ovarian cysts.                                        The Emergency Provider reviewed the vital signs and test results, which are outlined above.    ED Discussion     7:54 AM: Discussed pt's case with Dr. Jacinto (Colon and Rectal Surgery), who will come and evaluate pt at bedside.    9:37 AM: Dr. Jacinto (Colon and Rectal Surgery) has evaluated pt at bedside and recommends discharging the pt home. Dr. Jacinto does not suspect appendicitis.     9:39 AM: Reassessed pt at this time. Discussed with pt all pertinent ED information and results. Discussed pt dx and plan of tx. Gave pt all f/u and return to the ED instructions. All questions and concerns were addressed at this time. Pt expresses understanding  of information and instructions, and is comfortable with plan to discharge. Pt is stable for discharge.    I discussed with patient and/or family/caretaker that evaluation in the ED does not suggest any emergent or life threatening medical conditions requiring immediate intervention beyond what was provided in the ED, and I believe patient is safe for discharge.  Regardless, an unremarkable evaluation in the ED does not preclude the development or presence of a serious of life threatening condition. As such, patient was instructed to return immediately for any worsening or change in current symptoms.         ED Medication(s):  Medications - No data to display    Follow-up Information     [facility] In 2 days.    Contact information:  [address] [facility] [address] [phone]                  New Prescriptions    NAPROXEN (NAPROSYN) 375 MG TABLET    Take 1 tablet (375 mg total) by mouth 2 (two) times daily with meals.         Medical Decision Making    Medical Decision Making:   Clinical Tests:   Lab Tests: Ordered and Reviewed  Radiological Study: Ordered and Reviewed           Scribe Attestation:   Scribe #1: I performed the above scribed service and the documentation accurately describes the services I performed. I attest to the accuracy of the note.    Attending:   Physician Attestation Statement for Scribe #1: I, Cuauhtemoc Coburn MD, personally performed the services described in this documentation, as scribed by Emre Quinteros, in my presence, and it is both accurate and complete.          Clinical Impression       ICD-10-CM ICD-9-CM   1. Flank pain R10.9 789.09       Disposition:   Disposition: Discharged  Condition: Stable         Cuauhtemoc Coburn MD  03/17/20 0959

## 2020-03-17 NOTE — ASSESSMENT & PLAN NOTE
34-year-old female with right flank pain with CT scan with a mildly dilated appendix but air-filled lumen without periappendiceal stranding    - presentation and exam not consistent with appendicitis.  Patient has no leukocytosis, no fever, no abdominal pain, no nausea or vomiting and no tenderness on exam in the right lower quadrant.  - discussed with patient that if she worsens with right lower quadrant pain, develops nausea or vomiting or fever, she should re-presented emergency department for re-evaluation.  Discussed that I would not recommend taking out the appendix especially in light of the ongoing corona virus epidemic.  She voiced understanding of this and is agreeable to proceed in this manner  - okay for discharge from surgical perspective.  No definitive follow-up required.  Continue ongoing workup and treatment of her flank pain per ER physician's

## 2020-03-17 NOTE — CONSULTS
[facility] -   General Surgery  Consult Note    Patient Name: Melissa Santos  MRN: [id number]  Code Status: No Order  Admission Date: 3/17/2020  Hospital Length of Stay: 0 days  Attending Physician: Cuauhtemoc Coburn MD  Primary Care Provider: Primary Doctor No    Patient information was obtained from patient, past medical records and ER records.     Consults   Consulted by: Cuauhtemoc Coburn MD  Consultation provided by: Rick Jacinto MD    Subjective:     Principal Problem: right flank pain    History of Present Illness: 34-year-old female who presents to the [facility] emergency department for a 1 day history of right flank pain.  Patient states that the pain was mildly severe in the right flank that a constant moderate severity.  She denies any associated nausea, vomiting, fever, chills, diarrhea or constipation.  He has no significant medical or surgical history.  She had a normal WBC count in the ER.  She had a CT scan with a mildly dilated appendix but air-filled lumen which could not completely rule out appendicitis although it was doubted.  Surgery is consulted for evaluation.    No current facility-administered medications on file prior to encounter.      Current Outpatient Medications on File Prior to Encounter   Medication Sig    ALPRAZolam (XANAX) 0.5 MG tablet Take 1 tablet (0.5 mg total) by mouth 3 (three) times daily as needed for Anxiety. (Patient not taking: Reported on 3/17/2020)    diclofenac (VOLTAREN) 50 MG EC tablet Take 1 tablet (50 mg total) by mouth 3 (three) times daily as needed. (Patient not taking: Reported on 3/17/2020)    diclofenac (VOLTAREN) 50 MG EC tablet Take 1 tablet (50 mg total) by mouth 3 (three) times daily as needed. (Patient not taking: Reported on 3/17/2020)    divalproex (DEPAKOTE) 500 MG TbEC Take 500 mg by mouth every 12 (twelve) hours.    ferrous gluconate (FERGON) 325 MG Tab Take 1 tablet (325 mg total) by mouth daily with breakfast. (Patient  not taking: Reported on 3/17/2020)    hydrOXYzine (ATARAX) 25 MG tablet Take 2-4 tablets ( mg total) by mouth every 6 to 8 hours as needed for Anxiety. (Patient not taking: Reported on 3/17/2020)    lorazepam (ATIVAN) 1 MG tablet Take 0.5 tablets (0.5 mg total) by mouth every 6 (six) hours as needed for Anxiety. (Patient not taking: Reported on 3/17/2020)    METRONIDAZOLE (FLAGYL ORAL) Take by mouth.    risperidone (RISPERDAL M-TAB) 0.5 MG TbDL Take by mouth.    [DISCONTINUED] naproxen (NAPROSYN) 375 MG tablet Take 1 tablet (375 mg total) by mouth 2 (two) times daily with meals. (Patient not taking: Reported on 3/17/2020)       Review of patient's allergies indicates:  No Known Allergies    Past Medical History:   Diagnosis Date    Anxiety     Bipolar disorder     Heart murmur      Past Surgical History:   Procedure Laterality Date    TUBAL LIGATION      uterine ablation       Family History     None        Tobacco Use    Smoking status: Never Smoker   Substance and Sexual Activity    Alcohol use: Yes     Comment: sometimes    Drug use: No    Sexual activity: Not on file     Review of Systems   Constitutional: Negative for activity change, appetite change, chills, fatigue, fever and unexpected weight change.   HENT: Negative for congestion, ear pain, sore throat and trouble swallowing.    Eyes: Negative for pain, redness and itching.   Respiratory: Negative for cough, shortness of breath and wheezing.    Cardiovascular: Negative for chest pain, palpitations and leg swelling.   Gastrointestinal: Negative for abdominal distention, abdominal pain, anal bleeding, blood in stool, constipation, diarrhea, nausea, rectal pain and vomiting.   Endocrine: Negative for cold intolerance, heat intolerance and polyuria.   Genitourinary: Positive for flank pain. Negative for dysuria, frequency and hematuria.   Musculoskeletal: Negative for gait problem, joint swelling and neck pain.   Skin: Negative for color  change, rash and wound.   Allergic/Immunologic: Negative for environmental allergies and immunocompromised state.   Neurological: Negative for dizziness, speech difficulty, weakness and numbness.   Psychiatric/Behavioral: Negative for agitation, confusion and hallucinations.     Objective:     Vital Signs (Most Recent):  Temp: 98.4 °F (36.9 °C) (03/17/20 0736)  Pulse: 66 (03/17/20 0736)  Resp: 18 (03/17/20 0736)  BP: (!) 160/97 (03/17/20 0736)  SpO2: 99 % (03/17/20 0736) Vital Signs (24h Range):  Temp:  [98.4 °F (36.9 °C)-98.6 °F (37 °C)] 98.4 °F (36.9 °C)  Pulse:  [66-74] 66  Resp:  [18] 18  SpO2:  [99 %-100 %] 99 %  BP: (160-166)/(95-97) 160/97     Weight: 83.3 kg (183 lb 12.1 oz)  Body mass index is 29.66 kg/m².    Physical Exam   Constitutional: She is oriented to person, place, and time. She appears well-developed.   HENT:   Head: Normocephalic and atraumatic.   Eyes: Conjunctivae and EOM are normal.   Neck: Normal range of motion. No thyromegaly present.   Cardiovascular: Normal rate and regular rhythm.   Pulmonary/Chest: Effort normal. No respiratory distress.   Abdominal: Soft. She exhibits no distension and no mass. There is no tenderness.   No RLQ TTP; +mild R flank tenderness; no previous incisions   Musculoskeletal: Normal range of motion. She exhibits no edema or tenderness.   Neurological: She is alert and oriented to person, place, and time.   Skin: Skin is warm and dry. Capillary refill takes less than 2 seconds. No rash noted.   Psychiatric: She has a normal mood and affect.       Significant Labs:  CBC:   Recent Labs   Lab 03/17/20 0637   WBC 4.09   RBC 4.39   HGB 11.3*   HCT 36.0*      MCV 82   MCH 25.7*   MCHC 31.4*     BMP:   Recent Labs   Lab 03/17/20 0637         K 3.8      CO2 23   BUN 8   CREATININE 0.8   CALCIUM 9.3       Significant Diagnostics:  I have reviewed all pertinent imaging results/findings within the past 24 hours.   FINDINGS:  The heart size is  normal.  Lung bases are grossly clear.  There is no free intraperitoneal air or bowel obstruction.  Bony windows show no gross acute abnormality.    Liver and spleen show no focal abnormality for a non contrasted scan.  Gallbladder, adrenal glands, and pancreas are unremarkable.  There is no ascites or adenopathy.  Moderate volume of stool noted in the colon.  There is a small fat containing umbilical hernia.  The appendix is upper limits of normal in size at 7 mm.  There is intraluminal air however without definite wall thickening.  This is probably upper limits of normal, please correlate.  Retrocecal appendix.    Bladder is grossly unremarkable.  Suspect a few diverticula in the distal colon without may evidence of diverticulitis.    The kidneys show no suspicious mass.  No may obstruction.  No definite stones.    Lack of oral contrast limits evaluation of bowel loops but there is no definite acute abnormality suggested.  Calcifications in the pelvis presumably due to phleboliths.  Probable small ovarian cysts.      Impression       1.  No kidney stones or obstruction.    2.  The appendix measures 7 mm in diameter which is borderline.  There is intraluminal air however.  Doubt but can not totally exclude early appendicitis from the size.  Please correlate clinically.    3.  No bowel obstruction.  No free air.     Assessment/Plan:     Flank pain  34-year-old female with right flank pain with CT scan with a mildly dilated appendix but air-filled lumen without periappendiceal stranding    - presentation and exam not consistent with appendicitis.  Patient has no leukocytosis, no fever, no abdominal pain, no nausea or vomiting and no tenderness on exam in the right lower quadrant.  - discussed with patient that if she worsens with right lower quadrant pain, develops nausea or vomiting or fever, she should re-presented emergency department for re-evaluation.  Discussed that I would not recommend taking out the  appendix especially in light of the ongoing corona virus epidemic.  She voiced understanding of this and is agreeable to proceed in this manner  - okay for discharge from surgical perspective.  No definitive follow-up required.  Continue ongoing workup and treatment of her flank pain per ER physician's      VTE Risk Mitigation (From admission, onward)    None          Thank you for your consult. I will sign off. Please contact us if you have any additional questions.    Rick Jacinto MD  General Surgery  [facility]

## 2020-03-23 ENCOUNTER — HOSPITAL ENCOUNTER (EMERGENCY)
Facility: HOSPITAL | Age: 35
Discharge: HOME | End: 2020-03-23
Attending: EMERGENCY MEDICINE
Payer: COMMERCIAL

## 2020-03-23 VITALS
HEART RATE: 77 BPM | WEIGHT: 184 LBS | HEIGHT: 65 IN | SYSTOLIC BLOOD PRESSURE: 159 MMHG | BODY MASS INDEX: 30.66 KG/M2 | OXYGEN SATURATION: 97 % | DIASTOLIC BLOOD PRESSURE: 87 MMHG | RESPIRATION RATE: 20 BRPM | TEMPERATURE: 99 F

## 2020-03-23 DIAGNOSIS — J02.0 STREP PHARYNGITIS: Primary | ICD-10-CM

## 2020-03-23 DIAGNOSIS — J02.9 SORE THROAT: ICD-10-CM

## 2020-03-23 LAB — HIV 1+2 AB+HIV1 P24 AG SERPL QL IA: NEGATIVE

## 2020-03-23 PROCEDURE — 99284 EMERGENCY DEPT VISIT MOD MDM: CPT | Mod: 25

## 2020-03-23 PROCEDURE — 86703 HIV-1/HIV-2 1 RESULT ANTBDY: CPT

## 2020-03-23 PROCEDURE — 63600175 PHARM REV CODE 636 W HCPCS: Mod: JG | Performed by: EMERGENCY MEDICINE

## 2020-03-23 PROCEDURE — 96372 THER/PROPH/DIAG INJ SC/IM: CPT

## 2020-03-23 RX ADMIN — PENICILLIN G BENZATHINE 1.2 MILLION UNITS: 1200000 INJECTION, SUSPENSION INTRAMUSCULAR at 12:03

## 2020-03-23 NOTE — ED NOTES
Pt reports sore throat x 2 days. Pt denies any other associated symptoms.     Patient identifiers verified and correct for Melissa Santos.    LOC: The patient is awake, alert and aware of environment with an appropriate affect, the patient is oriented x 3 and speaking appropriately.  APPEARANCE: Patient resting comfortably and in no acute distress, patient is clean and well groomed, patient's clothing is properly fastened.  SKIN: The skin is warm and dry, color consistent with ethnicity, patient has normal skin turgor and moist mucus membranes, skin intact, no breakdown or bruising noted.  MUSCULOSKELETAL: Patient moving all extremities spontaneously.  RESPIRATORY: Airway is open and patent, respirations are spontaneous.  CARDIAC: Patient has a normal rate, no periphreal edema noted, capillary refill < 3 seconds.  ABDOMEN: Soft and non tender to palpation.

## 2021-04-14 ENCOUNTER — HOSPITAL ENCOUNTER (EMERGENCY)
Facility: HOSPITAL | Age: 36
Discharge: HOME | End: 2021-04-14
Attending: EMERGENCY MEDICINE

## 2021-04-14 VITALS
HEART RATE: 64 BPM | DIASTOLIC BLOOD PRESSURE: 86 MMHG | OXYGEN SATURATION: 100 % | HEIGHT: 66 IN | TEMPERATURE: 98 F | BODY MASS INDEX: 29.83 KG/M2 | RESPIRATION RATE: 20 BRPM | SYSTOLIC BLOOD PRESSURE: 132 MMHG | WEIGHT: 185.63 LBS

## 2021-04-14 DIAGNOSIS — R10.11 RIGHT UPPER QUADRANT ABDOMINAL PAIN: Primary | ICD-10-CM

## 2021-04-14 LAB
ALBUMIN SERPL BCP-MCNC: 4.1 G/DL (ref 3.5–5.2)
ALP SERPL-CCNC: 73 U/L (ref 55–135)
ALT SERPL W/O P-5'-P-CCNC: 18 U/L (ref 10–44)
ANION GAP SERPL CALC-SCNC: 11 MMOL/L (ref 8–16)
AST SERPL-CCNC: 19 U/L (ref 10–40)
B-HCG UR QL: NEGATIVE
BASOPHILS # BLD AUTO: 0.02 K/UL (ref 0–0.2)
BASOPHILS NFR BLD: 0.4 % (ref 0–1.9)
BILIRUB SERPL-MCNC: 0.4 MG/DL (ref 0.1–1)
BILIRUB UR QL STRIP: NEGATIVE
BUN SERPL-MCNC: 10 MG/DL (ref 6–20)
CALCIUM SERPL-MCNC: 9.1 MG/DL (ref 8.7–10.5)
CHLORIDE SERPL-SCNC: 104 MMOL/L (ref 95–110)
CLARITY UR: CLEAR
CO2 SERPL-SCNC: 24 MMOL/L (ref 23–29)
COLOR UR: YELLOW
CREAT SERPL-MCNC: 0.8 MG/DL (ref 0.5–1.4)
DIFFERENTIAL METHOD: ABNORMAL
EOSINOPHIL # BLD AUTO: 0.1 K/UL (ref 0–0.5)
EOSINOPHIL NFR BLD: 2.5 % (ref 0–8)
ERYTHROCYTE [DISTWIDTH] IN BLOOD BY AUTOMATED COUNT: 14.4 % (ref 11.5–14.5)
EST. GFR  (AFRICAN AMERICAN): >60 ML/MIN/1.73 M^2
EST. GFR  (NON AFRICAN AMERICAN): >60 ML/MIN/1.73 M^2
EXTRA GOLD TOP RAINBOW: NORMAL
GLUCOSE SERPL-MCNC: 105 MG/DL (ref 70–110)
GLUCOSE UR QL STRIP: NEGATIVE
HCT VFR BLD AUTO: 35.5 % (ref 37–48.5)
HCV AB SERPL QL IA: NEGATIVE
HGB BLD-MCNC: 11.3 G/DL (ref 12–16)
HGB UR QL STRIP: ABNORMAL
HIV 1+2 AB+HIV1 P24 AG SERPL QL IA: NEGATIVE
IMM GRANULOCYTES # BLD AUTO: 0.01 K/UL (ref 0–0.04)
IMM GRANULOCYTES NFR BLD AUTO: 0.2 % (ref 0–0.5)
KETONES UR QL STRIP: NEGATIVE
LEUKOCYTE ESTERASE UR QL STRIP: ABNORMAL
LIPASE SERPL-CCNC: 29 U/L (ref 4–60)
LYMPHOCYTES # BLD AUTO: 1.1 K/UL (ref 1–4.8)
LYMPHOCYTES NFR BLD: 24.2 % (ref 18–48)
MCH RBC QN AUTO: 26.7 PG (ref 27–31)
MCHC RBC AUTO-ENTMCNC: 31.8 G/DL (ref 32–36)
MCV RBC AUTO: 84 FL (ref 82–98)
MICROSCOPIC COMMENT: NORMAL
MONOCYTES # BLD AUTO: 0.5 K/UL (ref 0.3–1)
MONOCYTES NFR BLD: 10.4 % (ref 4–15)
NEUTROPHILS # BLD AUTO: 2.9 K/UL (ref 1.8–7.7)
NEUTROPHILS NFR BLD: 62.3 % (ref 38–73)
NITRITE UR QL STRIP: NEGATIVE
NRBC BLD-RTO: 0 /100 WBC
PH UR STRIP: 6 [PH] (ref 5–8)
PLATELET # BLD AUTO: 292 K/UL (ref 150–450)
PMV BLD AUTO: 9 FL (ref 9.2–12.9)
POTASSIUM SERPL-SCNC: 3.6 MMOL/L (ref 3.5–5.1)
PROT SERPL-MCNC: 7.7 G/DL (ref 6–8.4)
PROT UR QL STRIP: NEGATIVE
RBC # BLD AUTO: 4.24 M/UL (ref 4–5.4)
RBC #/AREA URNS HPF: 2 /HPF (ref 0–4)
SODIUM SERPL-SCNC: 139 MMOL/L (ref 136–145)
SP GR UR STRIP: >=1.03 (ref 1–1.03)
SQUAMOUS #/AREA URNS HPF: 4 /HPF
URN SPEC COLLECT METH UR: ABNORMAL
UROBILINOGEN UR STRIP-ACNC: 1 EU/DL
WBC # BLD AUTO: 4.72 K/UL (ref 3.9–12.7)
WBC #/AREA URNS HPF: 5 /HPF (ref 0–5)

## 2021-04-14 PROCEDURE — 25500020 PHARM REV CODE 255: Performed by: PHYSICIAN ASSISTANT

## 2021-04-14 PROCEDURE — A9698 NON-RAD CONTRAST MATERIALNOC: HCPCS | Performed by: PHYSICIAN ASSISTANT

## 2021-04-14 PROCEDURE — 81025 URINE PREGNANCY TEST: CPT | Performed by: EMERGENCY MEDICINE

## 2021-04-14 PROCEDURE — 99285 EMERGENCY DEPT VISIT HI MDM: CPT | Mod: 25

## 2021-04-14 PROCEDURE — 25000003 PHARM REV CODE 250: Performed by: EMERGENCY MEDICINE

## 2021-04-14 PROCEDURE — 83690 ASSAY OF LIPASE: CPT | Performed by: EMERGENCY MEDICINE

## 2021-04-14 PROCEDURE — 85025 COMPLETE CBC W/AUTO DIFF WBC: CPT | Performed by: EMERGENCY MEDICINE

## 2021-04-14 PROCEDURE — 81000 URINALYSIS NONAUTO W/SCOPE: CPT | Performed by: EMERGENCY MEDICINE

## 2021-04-14 PROCEDURE — 86803 HEPATITIS C AB TEST: CPT | Performed by: EMERGENCY MEDICINE

## 2021-04-14 PROCEDURE — 86703 HIV-1/HIV-2 1 RESULT ANTBDY: CPT | Performed by: EMERGENCY MEDICINE

## 2021-04-14 PROCEDURE — 80053 COMPREHEN METABOLIC PANEL: CPT | Performed by: EMERGENCY MEDICINE

## 2021-04-14 RX ORDER — HYOSCYAMINE SULFATE 0.12 MG/1
0.12 TABLET SUBLINGUAL
Status: COMPLETED | OUTPATIENT
Start: 2021-04-14 | End: 2021-04-14

## 2021-04-14 RX ORDER — NAPROXEN 500 MG/1
500 TABLET ORAL 2 TIMES DAILY WITH MEALS
Qty: 60 TABLET | Refills: 0 | Status: SHIPPED | OUTPATIENT
Start: 2021-04-14

## 2021-04-14 RX ADMIN — IOHEXOL 75 ML: 350 INJECTION, SOLUTION INTRAVENOUS at 09:04

## 2021-04-14 RX ADMIN — IOHEXOL 1000 ML: 9 SOLUTION ORAL at 09:04

## 2021-04-14 RX ADMIN — HYOSCYAMINE SULFATE 0.12 MG: 0.12 TABLET ORAL; SUBLINGUAL at 06:04

## 2021-04-28 ENCOUNTER — PATIENT MESSAGE (OUTPATIENT)
Dept: RESEARCH | Facility: HOSPITAL | Age: 36
End: 2021-04-28